# Patient Record
Sex: FEMALE | Race: OTHER
[De-identification: names, ages, dates, MRNs, and addresses within clinical notes are randomized per-mention and may not be internally consistent; named-entity substitution may affect disease eponyms.]

---

## 2018-05-30 ENCOUNTER — HOSPITAL ENCOUNTER (OUTPATIENT)
Dept: HOSPITAL 61 - KCIC | Age: 33
Discharge: HOME | End: 2018-05-30
Attending: FAMILY MEDICINE
Payer: COMMERCIAL

## 2018-05-30 DIAGNOSIS — G89.29: ICD-10-CM

## 2018-05-30 DIAGNOSIS — M25.851: Primary | ICD-10-CM

## 2018-05-30 PROCEDURE — 73502 X-RAY EXAM HIP UNI 2-3 VIEWS: CPT

## 2018-07-02 ENCOUNTER — HOSPITAL ENCOUNTER (OUTPATIENT)
Dept: HOSPITAL 61 - KCIC MRI | Age: 33
Discharge: HOME | End: 2018-07-02
Attending: ORTHOPAEDIC SURGERY
Payer: COMMERCIAL

## 2018-07-02 DIAGNOSIS — G89.29: ICD-10-CM

## 2018-07-02 DIAGNOSIS — M25.551: Primary | ICD-10-CM

## 2018-07-02 PROCEDURE — 73721 MRI JNT OF LWR EXTRE W/O DYE: CPT

## 2018-08-14 ENCOUNTER — HOSPITAL ENCOUNTER (EMERGENCY)
Dept: HOSPITAL 61 - ER | Age: 33
Discharge: HOME | End: 2018-08-14
Payer: COMMERCIAL

## 2018-08-14 VITALS
DIASTOLIC BLOOD PRESSURE: 60 MMHG | DIASTOLIC BLOOD PRESSURE: 60 MMHG | SYSTOLIC BLOOD PRESSURE: 134 MMHG | SYSTOLIC BLOOD PRESSURE: 134 MMHG | SYSTOLIC BLOOD PRESSURE: 134 MMHG | DIASTOLIC BLOOD PRESSURE: 60 MMHG

## 2018-08-14 VITALS — HEIGHT: 64 IN | BODY MASS INDEX: 37.22 KG/M2 | WEIGHT: 218 LBS

## 2018-08-14 DIAGNOSIS — G43.909: ICD-10-CM

## 2018-08-14 DIAGNOSIS — J30.9: Primary | ICD-10-CM

## 2018-08-14 PROCEDURE — 99281 EMR DPT VST MAYX REQ PHY/QHP: CPT

## 2018-08-14 NOTE — PHYS DOC
Past Medical History


Past Medical History:  Migraines


Additional Past Medical Histor:  back problems


Past Surgical History:  


Alcohol Use:  None


Drug Use:  None





Adult General


Chief Complaint


Chief Complaint:  Congestion





HPI


HPI





Patient is a 32  year old male who presents to the emergency Department today 

with complaints of nasal congestion for the last week with a runny nose, 

sneezing and itchy watery eyes. She states that the symptoms began after they 

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 She denies any 

fever, sore throat, rash. States that she has had a bit of a dry cough. She 

denies any shortness of breath or wheezing. Her only health history is some 

depression and a bad right hip. She denies any surgical history, and is unable 

to remember the name of her depression medication. She denies any known drug 

allergies.





Review of Systems


Review of Systems





Constitutional: Denies fever or chills []


Eyes: Denies change in visual acuity, redness, or eye pain; reports watery eyes 

and itching for the last week []


HENT: Denies ear pain or sore throat, reports nasal congestion, runny nose with 

clear drainage, and sneezing []


Respiratory: Denies wheezing or shortness of breath; reports dry cough []


Cardiovascular: Denies chest pain


GI: Denies abdominal pain, nausea, vomiting


Integument: Denies rash 


Neurologic: Denies focal weakness or sensory changes, reports mild headache








All other systems were reviewed and found to be within normal limits, except as 

documented in this note.





Allergies


Allergies





Allergies








Coded Allergies Type Severity Reaction Last Updated Verified


 


  No Known Drug Allergies    3/31/14 No











Physical Exam


Physical Exam





Constitutional: Well developed, well nourished, no acute distress, non-toxic 

appearance. []


HENT: Normocephalic, atraumatic, bilateral external ears normal, bilateral TMs 

are normal, cobblestone appearance of posterior pharynx with white postnasal 

drainage, oropharynx moist, no oral exudates, erythema and edematous turbinates 

of bilateral nares, 


Eyes: PERRLA,  conjunctiva normal, no discharge, allergic shiners bilaterally [

] 


Neck: Normal range of motion, no tenderness, no lymphadenopathy, supple, no 

stridor. [] 


Cardiovascular:Heart rate regular rhythm, no murmur []


Lungs & Thorax:  Bilateral breath sounds clear to auscultation []


Skin: Warm, dry, no erythema, no rash. [] 


Neurologic: Alert and oriented X 3, normal motor function, normal sensory 

function, no focal deficits noted. []


Psychologic: Affect normal, judgement normal, mood normal. []





Current Patient Data


Vital Signs





 Vital Signs








  Date Time  Temp Pulse Resp B/P (MAP) Pulse Ox O2 Delivery O2 Flow Rate FiO2


 


18 22:00 97.6 80 20 134/60 (84) 98 Room Air  





 97.6       











EKG


EKG


[]





Radiology/Procedures


Radiology/Procedures


[]





Course & Med Decision Making


Course & Med Decision Making


Pertinent Labs and Imaging studies reviewed. (See chart for details)


Patient is a 32-year-old female who presents to the emergency room with 

complaints of a dry cough, nasal congestion, sneezing, and itchy watery eyes 

for the last week after remodeling began at her place of employment.


VSS, patient history and physical exam are consistent with allergic rhinitis. 

Patient is treated as such. Advised patient to purchase an over-the-counter 

antihistamine such as Zyrtec, Claritin, or Allegra and to take this medication 

at night. Also encouraged patient to buy over-the-counter 4 days into to use 2 

sprays in each nare once a day in the mornings. Encouraged patient to follow up 

with her primary care doctor in 1-2 days. Patient verbalized an understanding 

of home care, over-the-counter medications, follow-up, and return to ED 

instructions and was in agreement with plan of care.


[]





Dragon Disclaimer


Dragon Disclaimer


This electronic medical record was generated, in whole or in part, using a 

voice recognition dictation system.





Departure


Departure


Impression:  


 Primary Impression:  


 Allergic rhinitis


 Additional Impression:  


 Allergic cough


Disposition:  01 HOME, SELF-CARE


Condition:  STABLE


Referrals:  


RU DIAZ (PCP)


Patient Instructions:  Allergic Rhinitis





Additional Instructions:  


Recommend over-the-counter antihistamine such as Zyrtec, Claritin, or Allegra, 

in addition to daily use of over-the-counter Flonase 2 sprays each nare daily. 

Tylenol or ibuprofen prn pain/fever. Increase clear fluids. Avoid triggers such 

as smoke, fragrance, dust, and pollen. Follow-up with your primary care doctor 

in 1-2 days. Return to the emergency room if your symptoms worsen.





Problem Qualifiers








 Primary Impression:  


 Allergic rhinitis


 Allergic rhinitis trigger:  unspecified  Allergic rhinitis seasonality:  

unspecified  Qualified Codes:  J30.9 - Allergic rhinitis, unspecified








MAXIME BOB APRN Aug 14, 2018 22:28

## 2018-10-29 ENCOUNTER — HOSPITAL ENCOUNTER (OUTPATIENT)
Dept: HOSPITAL 61 - NM | Age: 33
Discharge: HOME | End: 2018-10-29
Attending: ORTHOPAEDIC SURGERY
Payer: COMMERCIAL

## 2018-10-29 DIAGNOSIS — M25.851: ICD-10-CM

## 2018-10-29 DIAGNOSIS — M51.27: Primary | ICD-10-CM

## 2018-10-29 DIAGNOSIS — M51.37: ICD-10-CM

## 2018-10-29 PROCEDURE — 78300 BONE IMAGING LIMITED AREA: CPT

## 2018-10-29 PROCEDURE — 72148 MRI LUMBAR SPINE W/O DYE: CPT

## 2018-10-29 PROCEDURE — 96374 THER/PROPH/DIAG INJ IV PUSH: CPT

## 2018-10-29 PROCEDURE — A9503 TC99M MEDRONATE: HCPCS

## 2018-10-29 NOTE — RAD
Examination: BONE SCAN LIMITED

 

History: right hip pain for 3 months, right acetabular cyst , no known 

injury. 26.5mCi Tc99m MDP

 

Comparison/Correlation: Right hip MRI exam 06/02/2018

 

Findings: 26.5 mCi technetium 99 MDP was intravenously administered for 

Limited bone scintigraphy of the level from the mid lumbar spine to the 

mid femoral shaft. Imaging was performed in anterior, posterior, GRAJEDA, 

right lateral, left lateral, and LPO projections. Total of 8 images were 

provided.

 

There is mild uptake involving the right supra-acetabular region 

corresponding to the patient's known cyst at this site. Uptake involving 

bony structures otherwise is normal. Radiotracer is present in the urinary

bladder.

 

 

Impression:

Mild uptake involving the right supra-acetabular region corresponding to 

the site of the patient's known cyst. No other sites of abnormal uptake.

 

Electronically signed by: Romeo Lorenzo MD (10/29/2018 4:58 PM) [facility]

## 2018-10-29 NOTE — RAD
MRI Lumbar Spine without contrast

 

History: Low back pain with right leg radiculopathy

 

Technique: Multiplanar, multi sequential noncontrast MR imaging was 

performed of the lumbar spine.

 

Contrast: None

 

Comparison: None

 

Findings: 

Lumbar vertebral body AP alignment is maintained. Conus terminates at 

L1-2. There is mild superior endplate irregularity and height loss of L4 

and L5 likely on a chronic or developmental basis, limbus vertebra of L5. 

There is no significant marrow edema. There is mild disc desiccation L3-4 

and to lesser degree at L4-5. There is posterior annular tear L5-S1. There

is likely small hemangioma of the L2 vertebral body. 

 

L2-L3:  Neural foramina and spinal canal are adequate.

 

L3-L4:  Spinal canal and neural foramina are adequate.

 

L4-L5:  There is very minimal posterior bulge. Neural foramina and spinal 

canal are adequate. There is minimal buckling of the ligamentum flavum.

 

L5-S1:  There is shallow broad posterior protrusion, no significant 

impingement of the descending S1 nerve roots or significant spinal 

stenosis. Neural foramina are adequate.

 

Impression: 

 

1.  There is no significant lumbar spinal stenosis or neural foramina 

compromise. There is shallow broad posterior protrusion at L5-S1.

 

Electronically signed by: Cezar Coronel MD (10/29/2018 11:21 AM) 

[id number]

## 2022-05-02 ENCOUNTER — HOSPITAL ENCOUNTER (OUTPATIENT)
Dept: HOSPITAL 61 - KCIC | Age: 37
End: 2022-05-02
Attending: PHYSICIAN ASSISTANT
Payer: COMMERCIAL

## 2022-05-02 DIAGNOSIS — M25.551: Primary | ICD-10-CM

## 2022-05-02 PROCEDURE — 73501 X-RAY EXAM HIP UNI 1 VIEW: CPT

## 2022-05-02 NOTE — [facility]
XR HIP (WITH OR WITHOUT PELVIS) RIGHT 1 VIEW 5/2/2022



Reason: CHRONIC PAIN RT HIP, NO INJURY  



Comparison: None



Technique: AP of the pelvis, additional view of the right hip



Findings:

No acute fracture or dislocation. Bone mineralization is within normal limits. The right acetabulum a
ppears shallow with uncovering of the right femoral head.



Impression:



1. No acute osseous abnormality.



2. Possible developmental hip dysplasia on the right.



Electronically signed by: Jake Valero MD (5/2/2022 3:51 PM) [id number]